# Patient Record
Sex: FEMALE | Race: WHITE | NOT HISPANIC OR LATINO | Employment: FULL TIME | URBAN - METROPOLITAN AREA
[De-identification: names, ages, dates, MRNs, and addresses within clinical notes are randomized per-mention and may not be internally consistent; named-entity substitution may affect disease eponyms.]

---

## 2017-01-09 RX ORDER — LEVETIRACETAM 500 MG/1
TABLET ORAL
Qty: 60 TABLET | Refills: 0 | Status: SHIPPED | OUTPATIENT
Start: 2017-01-09 | End: 2017-02-09 | Stop reason: SDUPTHER

## 2017-01-23 ENCOUNTER — HOSPITAL ENCOUNTER (OUTPATIENT)
Dept: CT IMAGING | Facility: HOSPITAL | Age: 52
Discharge: HOME | End: 2017-01-23
Admitting: PHYSICIAN ASSISTANT

## 2017-01-23 ENCOUNTER — OFFICE VISIT (OUTPATIENT)
Dept: NEUROSURGERY | Facility: CLINIC | Age: 52
End: 2017-01-23

## 2017-01-23 VITALS — TEMPERATURE: 98.5 F | WEIGHT: 135 LBS | HEIGHT: 66 IN | BODY MASS INDEX: 21.69 KG/M2

## 2017-01-23 DIAGNOSIS — S06.5XAA SUBACUTE SUBDURAL HEMATOMA (HCC): Primary | ICD-10-CM

## 2017-01-23 DIAGNOSIS — S06.5XAA SUBACUTE SUBDURAL HEMATOMA (HCC): ICD-10-CM

## 2017-01-23 PROCEDURE — 70450 CT HEAD/BRAIN W/O DYE: CPT

## 2017-01-23 PROCEDURE — 99214 OFFICE O/P EST MOD 30 MIN: CPT | Performed by: NEUROLOGICAL SURGERY

## 2017-01-23 NOTE — PROGRESS NOTES
Subjective     Chief Complaint: Subdural hematoma    Patient ID: Almaz Dimas is a 51 y.o. female is here today for follow-up.    Seizures    This is a new problem. The current episode started more than 1 week ago. The problem has been resolved. There was 1 seizure. Pertinent negatives include no confusion, no headaches, no speech difficulty, no visual disturbance, no sore throat, no chest pain, no cough, no nausea, no vomiting and no diarrhea. Characteristics include bladder incontinence and rhythmic jerking. Characteristics do not include apnea. The seizure(s) had no focality. There has been no fever.       This is a 51-year-old woman whom I saw in consultation in the hospital about 6 weeks ago for a subacute left sided subdural hematoma.  The patient had recently undergone some changes to her blood pressure medication, and had experienced a series of several syncopal and presyncopal episodes in the weeks leading up to her hospitalization.  She had an abnormal EEG and was started on antiseizure medications.  I was consult to for management of her small subdural.  She was ultimately discharged home with instructions to follow up with me with a repeat head CT, and she presents today to discuss the results of the CT.    Since being discharged from the hospital, she reports no problems with headaches, nausea, vomiting, strokelike symptoms, or seizures.  She does report that she still has episodes of lightheadedness.    The following portions of the patient's history were reviewed and updated as appropriate: allergies, current medications, past family history, past medical history, past social history, past surgical history and problem list.    Family history: No family history on file.    Social history:   Social History     Social History   • Marital status:      Spouse name: N/A   • Number of children: N/A   • Years of education: N/A     Occupational History   • Not on file.     Social History Main Topics    • Smoking status: Current Every Day Smoker     Packs/day: 0.50   • Smokeless tobacco: Not on file   • Alcohol use Yes      Comment: daily 4 beers daily   • Drug use: No   • Sexual activity: No      Comment:      Other Topics Concern   • Not on file     Social History Narrative       Review of Systems   Constitutional: Negative for activity change, appetite change, chills, diaphoresis, fatigue, fever and unexpected weight change.   HENT: Negative for congestion, dental problem, drooling, ear discharge, ear pain, facial swelling, hearing loss, mouth sores, nosebleeds, postnasal drip, rhinorrhea, sinus pressure, sneezing, sore throat, tinnitus, trouble swallowing and voice change.    Eyes: Negative for photophobia, pain, discharge, redness, itching and visual disturbance.   Respiratory: Negative for apnea, cough, choking, chest tightness, shortness of breath, wheezing and stridor.    Cardiovascular: Negative for chest pain, palpitations and leg swelling.   Gastrointestinal: Negative for abdominal distention, abdominal pain, anal bleeding, blood in stool, constipation, diarrhea, nausea, rectal pain and vomiting.   Endocrine: Negative for cold intolerance, heat intolerance, polydipsia, polyphagia and polyuria.   Genitourinary: Positive for bladder incontinence. Negative for decreased urine volume, difficulty urinating, dysuria, enuresis, flank pain, frequency, genital sores, hematuria and urgency.   Musculoskeletal: Negative for arthralgias, back pain, gait problem, joint swelling, myalgias, neck pain and neck stiffness.   Skin: Negative for color change, pallor, rash and wound.   Allergic/Immunologic: Negative for environmental allergies, food allergies and immunocompromised state.   Neurological: Negative for dizziness, tremors, seizures, syncope, facial asymmetry, speech difficulty, weakness, light-headedness, numbness and headaches.   Hematological: Negative for adenopathy. Does not bruise/bleed easily.  "  Psychiatric/Behavioral: Negative for agitation, behavioral problems, confusion, decreased concentration, dysphoric mood, hallucinations, self-injury, sleep disturbance and suicidal ideas. The patient is not nervous/anxious and is not hyperactive.    All other systems reviewed and are negative.      Objective   Temperature 98.5 °F (36.9 °C), height 66\" (167.6 cm), weight 135 lb (61.2 kg).  Body mass index is 21.79 kg/(m^2).    Physical Exam   Constitutional: She is oriented to person, place, and time. She appears well-developed and well-nourished.  Non-toxic appearance. No distress.   HENT:   Head: Normocephalic and atraumatic.   Mouth/Throat: Oropharynx is clear and moist.   Eyes: EOM are normal. Pupils are equal, round, and reactive to light. Right eye exhibits no discharge. Left eye exhibits no discharge.   Neck: Phonation normal. No JVD present. No tracheal deviation present. No thyromegaly present.   Cardiovascular: Normal rate and regular rhythm.    Pulmonary/Chest: Effort normal. No stridor. No respiratory distress. She has no wheezes.   Abdominal: Soft. Normal appearance. She exhibits no distension. There is no tenderness.   Musculoskeletal: She exhibits no edema.   Muscle Group     L          R  Deltoid                5          5  Bicep                  5          5  Tricep                 5          5                       5          5  Hand IO              5          5  Hip Flexor           5          5  Knee Extensor   5          5     ADF                    5          5  APF                    5          5      Neurological: She is alert and oriented to person, place, and time. She has normal reflexes. She displays normal reflexes. No cranial nerve deficit or sensory deficit. She exhibits normal muscle tone. She displays a negative Romberg sign. Coordination and gait normal. GCS eye subscore is 4. GCS verbal subscore is 5. GCS motor subscore is 6.   Reflex Scores:       Tricep reflexes are 2+ on " the right side and 2+ on the left side.       Bicep reflexes are 2+ on the right side and 2+ on the left side.       Brachioradialis reflexes are 2+ on the right side and 2+ on the left side.       Patellar reflexes are 2+ on the right side and 2+ on the left side.       Achilles reflexes are 2+ on the right side and 2+ on the left side.  CN II-XII grossly intact.    No pronator drift. FTN testing performed without dysmetria or bradykinesia.    Fine, resting axial tremor.  Fine, resting tremor of her right upper extremity as well.   Skin: Skin is warm and dry. She is not diaphoretic. No erythema.   Psychiatric: She has a normal mood and affect. Her speech is normal and behavior is normal. Judgment and thought content normal.   Nursing note and vitals reviewed.        Assessment/Plan     Independent Review of Radiographic Studies:      Available for my review is a noncontrast head CT of the brain that was performed today.  This is essentially an unremarkable study with no evidence of residual or recurrent subdural hematoma.  There are no extra-axial fluid collections.  There is no midline shift, and the ventricular system is unremarkable.  There is no radiographic evidence of acute intracranial abnormality.    Medical Decision Making:      This is a 51-year-old woman with a resolved left sided subdural hematoma.  I reviewed the signs and symptoms of intracranial mass effect with the patient.  I would not order any repeat imaging unless clinically indicated.  She can follow-up with me on an as-needed basis.    There are no diagnoses linked to this encounter.    No Follow-up on file.           This document signed by BELKIS Dinero MD January 23, 2017 10:02 AM

## 2017-01-23 NOTE — MR AVS SNAPSHOT
Almaz Dimas   1/23/2017 11:00 AM   Office Visit    Dept Phone:  [phone]   Encounter #:  [id number]    Provider:  Tai Dinero MD   Department:  [facility]                Your Full Care Plan              Your Updated Medication List          This list is accurate as of: 1/23/17 10:15 AM.  Always use your most recent med list.                amLODIPine 5 MG tablet   Commonly known as:  NORVASC       ibuprofen 800 MG tablet   Commonly known as:  ADVIL,MOTRIN       levETIRAcetam 500 MG tablet   Commonly known as:  KEPPRA   take 1 tablet by mouth every 12 hours       levothyroxine 25 MCG tablet   Commonly known as:  SYNTHROID, LEVOTHROID       lisinopril-hydrochlorothiazide 20-12.5 MG per tablet   Commonly known as:  PRINZIDE,ZESTORETIC       sertraline 50 MG tablet   Commonly known as:  ZOLOFT       SHAROBEL 0.35 MG tablet   Generic drug:  norethindrone               Instructions     None    Patient Instructions History      Upcoming Appointments     Visit Type Date Time Department    CT [facility] HEAD WO CONTRAST 1/23/2017  9:30 AM  [facility]    OFFICE VISIT 1/23/2017 11:00 AM [facility]    FOLLOW UP 3/30/2017  9:30 AM [facility]      MyChart Signup     Our records indicate that you have declined [facility] Online-ORhart signup. If you would like to sign up for Online-ORhart, please email [email or web address] or call [phone] to obtain an activation code.             Other Info from Your Visit           Your Appointments     Jan 23, 2017 11:00 AM EST   Office Visit with Tai Dinero MD   [facility] (--)    [address] ,  [address] [id number] [phone]           Arrive 15 minutes prior to appointment.            Mar 30, 2017  9:30 AM EDT   Follow Up with Nuzhat Parra MD   [facility] (--)    [address] [place] [id number] [phone]           Arrive 15 minutes prior to appointment.              Allergies     Clindamycin/lincomycin      Flagyl [Metronidazole]      Sulfa Antibiotics        Reason for Visit     Follow-up           Vital Signs     Temperature Height Weight Body Mass Index Smoking Status       98.5 °F (36.9 °C) 66\" (167.6 cm) 135 lb (61.2 kg) 21.79 kg/m2 Current Every Day Smoker         "

## 2017-02-10 ENCOUNTER — HOSPITAL ENCOUNTER (OUTPATIENT)
Dept: HOSPITAL 22 - RAD | Age: 52
End: 2017-02-10
Payer: MEDICAID

## 2017-02-10 DIAGNOSIS — R07.1: Primary | ICD-10-CM

## 2017-02-10 RX ORDER — LEVETIRACETAM 500 MG/1
TABLET ORAL
Qty: 60 TABLET | Refills: 6 | Status: SHIPPED | OUTPATIENT
Start: 2017-02-10 | End: 2017-03-30 | Stop reason: SDUPTHER

## 2017-02-10 NOTE — RADIOLOGY REPORT PS360
[id number]****
 
HISTORY: Posttraumatic chest wall pain
RT CHEST WALL PAIN
ORDERING PHYSICIAN: Ravi Valero
PATIENT AGE:  51 years
 
 
COMPARISON: None
 
FINDINGS: There are multiple healing right rib fractures involving the right
fourth, fifth, seventh, eighth, ninth, and 10th ribs with callus formation noted
over the fractures. The fourth rib fractures slightly displaced medially. There
is some mild pleural-parenchymal thickening along the right lower hemithorax
laterally with blunting of the CP angle suggesting small effusion versus pleural
thickening. There is some irregular increased density in the right lower lung
zone laterally could be due to atelectatic or fibrotic change. The left lung is
clear.
 
IMPRESSION: Multiple healing right rib fractures with pleural parenchymal
changes as described above

## 2017-02-10 NOTE — RADIOLOGY REPORT PS360
[id number]****
 
HISTORY: Posttraumatic chest wall pain
RT CHEST WALL PAIN
ORDERING PHYSICIAN: Ravi Valero
PATIENT AGE:  51 years
 
 
COMPARISON: None
 
FINDINGS: There are multiple healing right rib fractures involving the right
fourth, fifth, seventh, eighth, ninth, and 10th ribs with callus formation noted
over the fractures. The fourth rib fractures slightly displaced medially. There
is some mild pleural-parenchymal thickening along the right lower hemithorax
laterally with blunting of the CP angle suggesting small effusion versus pleural
thickening. There is some irregular increased density in the right lower lung
zone laterally could be due to atelectatic or fibrotic change. The left lung is
clear.
 
IMPRESSION: Multiple healing right rib fractures with pleural parenchymal
changes as described above

## 2017-03-30 ENCOUNTER — OFFICE VISIT (OUTPATIENT)
Dept: NEUROLOGY | Facility: CLINIC | Age: 52
End: 2017-03-30

## 2017-03-30 VITALS
BODY MASS INDEX: 21.69 KG/M2 | WEIGHT: 135 LBS | DIASTOLIC BLOOD PRESSURE: 76 MMHG | OXYGEN SATURATION: 99 % | SYSTOLIC BLOOD PRESSURE: 138 MMHG | RESPIRATION RATE: 18 BRPM | HEIGHT: 66 IN

## 2017-03-30 DIAGNOSIS — G40.919 EPILEPSY UNDETERMINED AS TO FOCAL OR GENERALIZED, INTRACTABLE (HCC): Primary | ICD-10-CM

## 2017-03-30 PROCEDURE — 99213 OFFICE O/P EST LOW 20 MIN: CPT | Performed by: PSYCHIATRY & NEUROLOGY

## 2017-03-30 RX ORDER — LEVETIRACETAM 500 MG/1
500 TABLET ORAL 2 TIMES DAILY
Qty: 60 TABLET | Refills: 11 | Status: SHIPPED | OUTPATIENT
Start: 2017-03-30 | End: 2019-02-11

## 2017-03-30 NOTE — PROGRESS NOTES
Subjective   Almaz CELIS is a 51 y.o. female.     History of Present Illness   Pt first seen here  for CBH f/u of seizure, with R side sx f/b GTCsz 16, found to have left SDH (about 1x6cm). Started on LVT 500mg bid, s/b neurosurgery who recommended conservative management of the SDH.      A year prior to that, had an episode where getting pajamas on, woke on bathroom floor, had been incontinent, found by  (who has since ), cleaned herself up and went to bed, didn't feel confused after.  heard fall but didn't see the event. No warning beforehand.  Has had 3 further episodes of LOC. First 16 was sitting at kitchen table one morning and woke up on floor, right side sore in ribs, but no TB or incontinence, unsure how long out. Felt OK afterward, carried on with day. Then with second event on , got up early, and again woke on floor, this time found by son in law, had had incontinence and head was sore. Diminished memory and not thinking clearly for a while afterwards, but after a while thinking clearly. Then third episode at work , eating a banana, woke with coworkers around her, on the floor. This was a witnessed convulsion.     Prior to these 3 events, no head injury.   In past has had fainting spells, or near syncope, where hot, has to sit down, feels like going to pass out, but not for some years. No similar feeling with these events, with no warning whatsoever  No  birth, CNS infection, TBI, or family h/o seizure.   Noted that given first event was a year ago, no apparent relation to recent SDH, so unclear if her seizures are of focal or generalized onset. With incontinence and lack of prodrome, as well as witnessed convulsion with one episode, these are all likely epileptic rather than syncopal spells.  Today reports doing well, no suspicion of seizures. Got dizzy a few times before discontinuing a blood pressure medicine that was started in November.  Feels a  little weak from being less active, off work.   Had f/u with neurosurgery. F/u CT (personally reviewed) showed no residual abnormality.     The following portions of the patient's history were reviewed and updated as appropriate: allergies, current medications, past family history, past medical history, past social history, past surgical history and problem list.    Review of Systems   Constitutional: Negative for fever and unexpected weight change.   Respiratory: Negative for cough and shortness of breath.    Cardiovascular: Negative for chest pain.       Objective   Physical Exam   Constitutional: She is oriented to person, place, and time. She appears well-developed and well-nourished.   HENT:   Head: Normocephalic and atraumatic.   Eyes: EOM are normal. Pupils are equal, round, and reactive to light.   Pulmonary/Chest: Effort normal.   Musculoskeletal: Normal range of motion.   Neurological: She is oriented to person, place, and time. She has a normal Finger-Nose-Finger Test and a normal Heel to Shin Test. Gait normal.   Skin: Skin is warm and dry.   Psychiatric: Her speech is normal.   Nursing note and vitals reviewed.      Neurologic Exam     Mental Status   Oriented to person, place, and time.   Speech: speech is normal   Level of consciousness: alert  Knowledge: consistent with education.   Able to name object. Normal comprehension.     Cranial Nerves     CN II   Visual fields full to confrontation.     CN III, IV, VI   Pupils are equal, round, and reactive to light.  Extraocular motions are normal.     CN VII   Facial expression full, symmetric.     CN IX, X   CN IX normal.   CN X normal.   Palate: symmetric    CN XI   CN XI normal.     CN XII   CN XII normal.     Motor Exam   Muscle bulk: normal  Right arm pronator drift: absent  Left arm pronator drift: absent    Strength   Strength 5/5 except as noted.     Sensory Exam   Light touch normal.     Gait, Coordination, and Reflexes     Gait  Gait:  normal    Coordination   Finger to nose coordination: normal  Heel to shin coordination: normal    Tremor   Resting tremor: absent  Intention tremor: absent  Action tremor: absent      Assessment/Plan   Almaz was seen today for seizures.    Diagnoses and all orders for this visit:    Epilepsy undetermined as to focal or generalized, intractable    Other orders  -     levETIRAcetam (KEPPRA) 500 MG tablet; Take 1 tablet by mouth 2 (Two) Times a Day.    Discussion/Summary:  Again discussed safety issues, driving law (can now legally drive), signs of seizures to monitor for, taking extra keppra 500mg and phone in case of suspicion of seizure.    Answered all questions.  18 minutes face to face time, 15 minutes spent in discussion as above.  Return in about 6 months (around 9/30/2017).

## 2017-12-07 ENCOUNTER — OFFICE VISIT (OUTPATIENT)
Dept: NEUROLOGY | Facility: CLINIC | Age: 52
End: 2017-12-07

## 2017-12-07 VITALS
HEIGHT: 66 IN | DIASTOLIC BLOOD PRESSURE: 90 MMHG | WEIGHT: 135 LBS | BODY MASS INDEX: 21.69 KG/M2 | SYSTOLIC BLOOD PRESSURE: 140 MMHG

## 2017-12-07 DIAGNOSIS — G40.919 EPILEPSY UNDETERMINED AS TO FOCAL OR GENERALIZED, INTRACTABLE (HCC): Primary | ICD-10-CM

## 2017-12-07 PROCEDURE — 99214 OFFICE O/P EST MOD 30 MIN: CPT | Performed by: PSYCHIATRY & NEUROLOGY

## 2017-12-07 NOTE — PROGRESS NOTES
Subjective   Almaz CELIS is a 52 y.o. female.     Chief Complaint   Patient presents with   • Seizures       History of Present Illness   Pt first seen here  for CBH f/u of seizure, with R side sx f/b GTCsz 16, found to have left SDH (about 1x6cm). Started on LVT 500mg bid, s/b neurosurgery who recommended conservative management of the SDH.       A year prior to that, had an episode where getting pajamas on, woke on bathroom floor, had been incontinent, found by  (who has since ), cleaned herself up and went to bed, didn't feel confused after.  heard fall but didn't see the event. No warning beforehand.  Has had 3 further episodes of LOC. First 16 was sitting at kitchen table one morning and woke up on floor, right side sore in ribs, but no TB or incontinence, unsure how long out. Felt OK afterward, carried on with day. Then with second event on , got up early, and again woke on floor, this time found by son in law, had had incontinence and head was sore. Diminished memory and not thinking clearly for a while afterwards, but after a while thinking clearly. Then third episode at work , eating a banana, woke with coworkers around her, on the floor. This was a witnessed convulsion.      Prior to these 3 events, no head injury.   In past has had fainting spells, or near syncope, where hot, has to sit down, feels like going to pass out, but not for some years. No similar feeling with these events, with no warning whatsoever  No  birth, CNS infection, TBI, or family h/o seizure.   Noted that given first event was a year ago, no apparent relation to recent SDH, so unclear if her seizures are of focal or generalized onset. With incontinence and lack of prodrome, as well as witnessed convulsion with one episode, these are all likely epileptic rather than syncopal spells.  3/17: doing well, no suspicion of seizures.  Had f/u with neurosurgery. F/u CT (personally reviewed)  showed no residual abnormality. Discussed safety, driving, continued LVT 500mg bid.  Today: has been doing well, cut dose on her own to 1 LVT 500mg -- doesn't think she really needs the medicine, and that it was stress that caused them. Cost of LVT will be an issue with new insurance. General health has been OK, happy to be back to work.  No seizures of any kind.     The following portions of the patient's history were reviewed and updated as appropriate: allergies, current medications, past family history, past medical history, past social history, past surgical history and problem list.    Allergies   Allergen Reactions   • Clindamycin/Lincomycin    • Flagyl [Metronidazole]    • Sulfa Antibiotics        Current Outpatient Prescriptions on File Prior to Visit   Medication Sig Dispense Refill   • ibuprofen (ADVIL,MOTRIN) 800 MG tablet Take 800 mg by mouth As Needed for mild pain (1-3).     • levETIRAcetam (KEPPRA) 500 MG tablet Take 1 tablet by mouth 2 (Two) Times a Day. (Patient taking differently: Take 500 mg by mouth Daily.) 60 tablet 11   • levothyroxine (SYNTHROID, LEVOTHROID) 25 MCG tablet Take 25 mcg by mouth Daily.     • norethindrone (SHAROBEL) 0.35 MG tablet Take 1 tablet by mouth Daily.     • [DISCONTINUED] amLODIPine (NORVASC) 5 MG tablet Take 5 mg by mouth Daily.     • [DISCONTINUED] lisinopril-hydrochlorothiazide (PRINZIDE,ZESTORETIC) 20-12.5 MG per tablet Take 1 tablet by mouth Daily.     • [DISCONTINUED] sertraline (ZOLOFT) 50 MG tablet Take 50 mg by mouth Daily.       No current facility-administered medications on file prior to visit.        Past Medical History:   Diagnosis Date   • Disease of thyroid gland    • Hypertension    • Raynaud phenomenon        No past surgical history on file.    Social History     Social History   • Marital status:      Spouse name: N/A   • Number of children: N/A   • Years of education: N/A     Occupational History   • Not on file.     Social History Main  "Topics   • Smoking status: Current Every Day Smoker     Packs/day: 0.50   • Smokeless tobacco: Not on file   • Alcohol use Yes      Comment: daily 4 beers daily   • Drug use: No   • Sexual activity: No      Comment:      Other Topics Concern   • Not on file     Social History Narrative       Review of Systems   Constitutional: Negative for fever and unexpected weight change.   Respiratory: Negative for cough and shortness of breath.    Cardiovascular: Negative for chest pain.       Objective   Blood pressure 140/90, height 167.6 cm (66\"), weight 61.2 kg (135 lb).    Physical Exam   Constitutional: She is oriented to person, place, and time. She appears well-developed and well-nourished.   HENT:   Head: Normocephalic and atraumatic.   Eyes: EOM are normal. Pupils are equal, round, and reactive to light.   Pulmonary/Chest: Effort normal.   Musculoskeletal: Normal range of motion.   Neurological: She is oriented to person, place, and time. She has a normal Finger-Nose-Finger Test and a normal Heel to Shin Test. Gait normal.   Skin: Skin is warm and dry.   Psychiatric: Her speech is normal.   Nursing note and vitals reviewed.      Neurologic Exam     Mental Status   Oriented to person, place, and time.   Speech: speech is normal   Level of consciousness: alert  Knowledge: consistent with education.   Able to name object. Normal comprehension.     Cranial Nerves     CN II   Visual fields full to confrontation.     CN III, IV, VI   Pupils are equal, round, and reactive to light.  Extraocular motions are normal.     CN VII   Facial expression full, symmetric.     CN IX, X   CN IX normal.   CN X normal.   Palate: symmetric    CN XI   CN XI normal.     CN XII   CN XII normal.     Motor Exam   Muscle bulk: normal  Right arm pronator drift: absent  Left arm pronator drift: absent    Strength   Strength 5/5 except as noted.     Sensory Exam   Light touch normal.     Gait, Coordination, and Reflexes     Gait  Gait: " normal    Coordination   Finger to nose coordination: normal  Heel to shin coordination: normal    Tremor   Resting tremor: absent  Intention tremor: absent  Action tremor: absent      Assessment/Plan     Almaz was seen today for seizures.    Diagnoses and all orders for this visit:    Epilepsy undetermined as to focal or generalized, intractable    Other orders  -     Topiramate ER (TROKENDI XR) 200 MG capsule sustained-release 24 hr; Take 200 mg by mouth Every Night.    Discussion/Summary:  Long discussion re: seizure tendency vs triggers (eg stress), and extremely high likelihood of further seizures without medication. Discussed risk of seizures, and potential for increased intractability. Discussed med alternatives at length, and will try trokendi, titrating to 200mg, Discussed potential SEs at length.   30 min face to face, 23 min in discussion as above.  Return in about 3 months (around 3/7/2018).

## 2018-05-14 RX ORDER — TOPIRAMATE 200 MG/1
CAPSULE, EXTENDED RELEASE ORAL
Qty: 30 CAPSULE | Refills: 3 | Status: SHIPPED | OUTPATIENT
Start: 2018-05-14 | End: 2018-10-02 | Stop reason: SDUPTHER

## 2018-05-24 RX ORDER — TOPIRAMATE 200 MG/1
CAPSULE, EXTENDED RELEASE ORAL
Qty: 30 CAPSULE | Refills: 3 | OUTPATIENT
Start: 2018-05-24

## 2018-10-02 RX ORDER — TOPIRAMATE 200 MG/1
CAPSULE, EXTENDED RELEASE ORAL
Qty: 30 CAPSULE | Refills: 3 | Status: SHIPPED | OUTPATIENT
Start: 2018-10-02 | End: 2019-02-11

## 2019-02-11 ENCOUNTER — OFFICE VISIT (OUTPATIENT)
Dept: NEUROLOGY | Facility: CLINIC | Age: 54
End: 2019-02-11

## 2019-02-11 VITALS
HEIGHT: 66 IN | WEIGHT: 135 LBS | BODY MASS INDEX: 21.69 KG/M2 | SYSTOLIC BLOOD PRESSURE: 126 MMHG | DIASTOLIC BLOOD PRESSURE: 66 MMHG

## 2019-02-11 DIAGNOSIS — G40.919 EPILEPSY UNDETERMINED AS TO FOCAL OR GENERALIZED, INTRACTABLE (HCC): Primary | ICD-10-CM

## 2019-02-11 PROCEDURE — 99214 OFFICE O/P EST MOD 30 MIN: CPT | Performed by: PHYSICIAN ASSISTANT

## 2019-02-11 RX ORDER — LEVETIRACETAM 500 MG/1
500 TABLET ORAL 2 TIMES DAILY
COMMUNITY
End: 2021-02-15

## 2019-02-11 NOTE — PROGRESS NOTES
Subjective     Chief Complaint: history of seizure s     History of Present Illness   Pt first seen here  for CBH f/u of seizure, with R side sx f/b GTCsz 16, found to have left SDH (about 1x6cm). Started on LVT 500mg bid, s/b neurosurgery who recommended conservative management of the SDH.       A year prior to that, had an episode where getting pajamas on, woke on bathroom floor, had been incontinent, found by  (who has since ), cleaned herself up and went to bed, didn't feel confused after.  heard fall but didn't see the event. No warning beforehand.  Has had 3 further episodes of LOC. First 16 was sitting at kitchen table one morning and woke up on floor, right side sore in ribs, but no TB or incontinence, unsure how long out. Felt OK afterward, carried on with day. Then with second event on , got up early, and again woke on floor, this time found by son in law, had had incontinence and head was sore. Diminished memory and not thinking clearly for a while afterwards, but after a while thinking clearly. Then third episode at work , eating a banana, woke with coworkers around her, on the floor. This was a witnessed convulsion.      Prior to these 3 events, no head injury.   In past has had fainting spells, or near syncope, where hot, has to sit down, feels like going to pass out, but not for some years. No similar feeling with these events, with no warning whatsoever  No  birth, CNS infection, TBI, or family h/o seizure.   Noted that given first event was a year ago, no apparent relation to recent SDH, so unclear if her seizures are of focal or generalized onset. With incontinence and lack of prodrome, as well as witnessed convulsion with one episode, these are all likely epileptic rather than syncopal spells.  3/17: doing well, no suspicion of seizures.  Had f/u with neurosurgery. F/u CT (personally reviewed) showed no residual abnormality. Discussed safety,  "driving, continued LVT 500mg bid.  12/17: has been doing well, cut dose on her own to 1 LVT 500mg -- doesn't think she really needs the medicine, and that it was stress that caused them. Cost of LVT will be an issue with new insurance. General health has been OK, happy to be back to work.  No seizures of any kind.      Today: She denies any seizure recurrence since her last visit and states that she is doing well overall. She ran out of Trokendi XR so states that she has been taking LVT 500mg nightly as she had this left over.         I have reviewed and confirmed the past family, social and medical history as accurate on 2/11/9.     Review of Systems   Constitutional: Negative.    HENT: Negative.    Eyes: Negative.    Respiratory: Negative.    Cardiovascular: Negative.    Gastrointestinal: Negative.    Endocrine: Negative.    Genitourinary: Negative.    Musculoskeletal: Negative.    Skin: Negative.    Allergic/Immunologic: Negative.    Neurological: Positive for seizures.   Hematological: Negative.    Psychiatric/Behavioral: Negative.        Objective     /66   Ht 167.6 cm (66\")   Wt 61.2 kg (135 lb)   BMI 21.79 kg/m²     General appearance today is normal.       Physical Exam   Neurological: She has normal strength. She has a normal Finger-Nose-Finger Test. Gait normal.   Psychiatric: Her speech is normal.        Neurologic Exam     Mental Status   Speech: speech is normal   Level of consciousness: alert  Normal comprehension.     Cranial Nerves   Cranial nerves II through XII intact.     Motor Exam   Muscle bulk: normal  Overall muscle tone: normal    Strength   Strength 5/5 throughout.     Sensory Exam   Light touch normal.     Gait, Coordination, and Reflexes     Gait  Gait: normal    Coordination   Finger to nose coordination: normal    Tremor   Resting tremor: absent          Assessment/Plan   Almaz was seen today for seizures.    Diagnoses and all orders for this visit:    Epilepsy undetermined as to " focal or generalized, intractable (CMS/HCC)    Other orders  -     Topiramate  MG capsule sustained-release 24 hr; Take 200 mg by mouth Every Night.          Discussion/Summary   Almaz CELIS returns to clinic today with a history of seizures. This was discussed in detail with the patient. After discussing potential treatment options, it was elected to restart Trokendi XR as she previously tolerated this well (I have provided her with samples to titrate up to 200mg). She will then follow up in 1 year, or sooner if needed.    I spent 25 minutes minutes face to face with the patient with 15 minutes spent on discussing diagnosis, prognosis, evaluation, current status, treatment options and management as discussed above.       As part of this visit I discussed the history with the patient .      Adelina Mora PA-C

## 2019-02-14 ENCOUNTER — TELEPHONE (OUTPATIENT)
Dept: NEUROLOGY | Facility: CLINIC | Age: 54
End: 2019-02-14

## 2019-02-14 NOTE — TELEPHONE ENCOUNTER
----- Message from Vanessa Mejíaed Rep sent at 2/12/2019  9:19 AM EST -----  Adelina     PT called in reference to the medication prescribed yesterday, Topiramate  MG capsule sustained-release 24 hr [[id number]] (Order [id number]). PT states that Adelina gave her a Department of Health and Human Services card to help cover the cost of the medication, but her insurance is requiring a PA. PT would like a PA completed and a call back when the medication has been filled.     Please call Almaz back:   [phone]

## 2019-02-20 ENCOUNTER — PRIOR AUTHORIZATION (OUTPATIENT)
Dept: NEUROLOGY | Facility: CLINIC | Age: 54
End: 2019-02-20

## 2019-02-20 NOTE — TELEPHONE ENCOUNTER
PA was submitted for patient's Trokendi XR 200MG Capsules on 02/20/2019 via CoverMyMeds. Key: [id number], DX Code: G40.919. Clinical questions were answered and sent to plan, waiting on determination. Alyssa stated that it could take between  hours to hear back with a reply.

## 2019-03-15 ENCOUNTER — HOSPITAL ENCOUNTER (OUTPATIENT)
Age: 54
End: 2019-03-15
Payer: COMMERCIAL

## 2019-03-15 DIAGNOSIS — Z12.31: Primary | ICD-10-CM

## 2019-03-15 PROCEDURE — 77067 SCR MAMMO BI INCL CAD: CPT

## 2019-07-19 RX ORDER — TOPIRAMATE 200 MG/1
CAPSULE, EXTENDED RELEASE ORAL
Qty: 30 CAPSULE | Refills: 3 | Status: SHIPPED | OUTPATIENT
Start: 2019-07-19 | End: 2019-12-05 | Stop reason: SDUPTHER

## 2019-07-25 RX ORDER — TOPIRAMATE 200 MG/1
CAPSULE, EXTENDED RELEASE ORAL
Qty: 30 CAPSULE | Refills: 3 | OUTPATIENT
Start: 2019-07-25

## 2019-12-02 RX ORDER — TOPIRAMATE 200 MG/1
CAPSULE, EXTENDED RELEASE ORAL
Qty: 30 CAPSULE | Refills: 3 | OUTPATIENT
Start: 2019-12-02

## 2019-12-09 RX ORDER — TOPIRAMATE 200 MG/1
CAPSULE, EXTENDED RELEASE ORAL
Qty: 30 CAPSULE | Refills: 3 | Status: SHIPPED | OUTPATIENT
Start: 2019-12-09 | End: 2020-02-10 | Stop reason: SDUPTHER

## 2019-12-09 RX ORDER — TOPIRAMATE 200 MG/1
CAPSULE, EXTENDED RELEASE ORAL
Qty: 30 CAPSULE | Refills: 3 | Status: SHIPPED | OUTPATIENT
Start: 2019-12-09 | End: 2019-12-09 | Stop reason: SDUPTHER

## 2020-02-10 ENCOUNTER — OFFICE VISIT (OUTPATIENT)
Dept: NEUROLOGY | Facility: CLINIC | Age: 55
End: 2020-02-10

## 2020-02-10 VITALS — HEIGHT: 66 IN | BODY MASS INDEX: 21.69 KG/M2 | WEIGHT: 135 LBS

## 2020-02-10 DIAGNOSIS — G40.919 EPILEPSY UNDETERMINED AS TO FOCAL OR GENERALIZED, INTRACTABLE (HCC): Primary | ICD-10-CM

## 2020-02-10 PROCEDURE — 99213 OFFICE O/P EST LOW 20 MIN: CPT | Performed by: PHYSICIAN ASSISTANT

## 2020-02-10 NOTE — PROGRESS NOTES
Subjective     Chief Complaint: seizures     History of Present Illness   Pt first seen here  for CBH f/u of seizure, with R side sx f/b GTCsz 16, found to have left SDH (about 1x6cm). Started on LVT 500mg bid, s/b neurosurgery who recommended conservative management of the SDH.       A year prior to that, had an episode where getting pajamas on, woke on bathroom floor, had been incontinent, found by  (who has since ), cleaned herself up and went to bed, didn't feel confused after.  heard fall but didn't see the event. No warning beforehand.  Has had 3 further episodes of LOC. First 16 was sitting at kitchen table one morning and woke up on floor, right side sore in ribs, but no TB or incontinence, unsure how long out. Felt OK afterward, carried on with day. Then with second event on , got up early, and again woke on floor, this time found by son in law, had had incontinence and head was sore. Diminished memory and not thinking clearly for a while afterwards, but after a while thinking clearly. Then third episode at work , eating a banana, woke with coworkers around her, on the floor. This was a witnessed convulsion.      Prior to these 3 events, no head injury.   In past has had fainting spells, or near syncope, where hot, has to sit down, feels like going to pass out, but not for some years. No similar feeling with these events, with no warning whatsoever  No  birth, CNS infection, TBI, or family h/o seizure.   Noted that given first event was a year ago, no apparent relation to recent SDH, so unclear if her seizures are of focal or generalized onset. With incontinence and lack of prodrome, as well as witnessed convulsion with one episode, these are all likely epileptic rather than syncopal spells.  3/17: doing well, no suspicion of seizures.  Had f/u with neurosurgery. F/u CT (personally reviewed) showed no residual abnormality. Discussed safety, driving,  "continued LVT 500mg bid.  12/17: has been doing well, cut dose on her own to 1 LVT 500mg -- doesn't think she really needs the medicine, and that it was stress that caused them. Cost of LVT will be an issue with new insurance. General health has been OK, happy to be back to work.  No seizures of any kind.      Today: Since her last visit in 2/19, she denies any seizure recurrence as well as any new concerns. She is still tolerating the Trokendi XR.        I have reviewed and confirmed the past family, social and medical history as accurate on 2/10/2020.     Review of Systems   Constitutional: Negative.    HENT: Negative.    Eyes: Negative.    Respiratory: Negative.    Cardiovascular: Negative.    Gastrointestinal: Negative.    Endocrine: Negative.    Genitourinary: Negative.    Musculoskeletal: Negative.    Skin: Negative.    Allergic/Immunologic: Negative.    Neurological: Positive for seizures.   Hematological: Negative.    Psychiatric/Behavioral: Negative.        Objective     Ht 167.6 cm (66\")   Wt 61.2 kg (135 lb)   BMI 21.79 kg/m²     General appearance today is normal.       Physical Exam   Neurological: Gait normal.   Psychiatric: Her speech is normal.        Neurologic Exam     Mental Status   Speech: speech is normal   Level of consciousness: alert  Normal comprehension.     Cranial Nerves   Cranial nerves II through XII intact.     Motor Exam   Muscle bulk: normal    Gait, Coordination, and Reflexes     Gait  Gait: normal    Tremor   Resting tremor: absent            Assessment/Plan   Almaz was seen today for seizures.    Diagnoses and all orders for this visit:    Epilepsy undetermined as to focal or generalized, intractable (CMS/HCC)    Other orders  -     Topiramate ER (TROKENDI XR) 200 MG capsule sustained-release 24 hr; Take 1 capsule by mouth Every Night.          Discussion/Summary   Almaz CELIS returns to clinic today with a history of seizures. This was discussed in detail. I again reviewed " her current status and treatment options. After discussing potential treatment options, it was elected to continue on Trokendri XR unchanged.She will then follow up in 1 year, or sooner if needed.   I spent 15 minutes face to face with the patient with 10 minutes spent on discussing diagnosis, prognosis, diagnostic testing, evaluation, current status, treatment options and management as discussed above.       As part of this visit I discussed the history with the patient  .      Adelina Mora PA-C

## 2020-02-11 ENCOUNTER — TELEPHONE (OUTPATIENT)
Dept: NEUROLOGY | Facility: CLINIC | Age: 55
End: 2020-02-11

## 2020-02-11 NOTE — TELEPHONE ENCOUNTER
On it! I actually received a call from pt's [facility] pharmacist, Sara who states Almaz is there now so relayed to both the pharmacist and patient that we would like to do a PA as this medication was prescribed for SZs and we do not want to chance anything!  Notified I can also send Almaz some samples in the mail to get her through until a decision is made regarding coverage from her insurance. However, Almaz states she ran out of her Trokendi 200 mg Nightly on Sunday evening and as she lives in [place] cannot come  samples from clinic and the samples in the mail will not arrive until Thursday most likely!    Luckily, her pharmacist, Sara agreed to give her a couple to get her through until the samples arrive and hopefully PA is approved!    Mailed 14 day supply of samples to verified address on file.

## 2020-02-11 NOTE — TELEPHONE ENCOUNTER
Sara from [facility] called stating that Trokendi XR is not covered by pt's new insurance plan. Sara states insurance is recommending generic Topamax. Pt has been on Trokendi for awhile, so she states she is not sure if you want to change Rx or try a PA. Please advise.

## 2020-02-18 ENCOUNTER — PRIOR AUTHORIZATION (OUTPATIENT)
Dept: NEUROLOGY | Facility: CLINIC | Age: 55
End: 2020-02-18

## 2020-02-20 ENCOUNTER — TELEPHONE (OUTPATIENT)
Dept: NEUROLOGY | Facility: CLINIC | Age: 55
End: 2020-02-20

## 2020-03-03 NOTE — TELEPHONE ENCOUNTER
Alyssa is not wanting to pay for this, however spoke with the patient and she stated she may be able to use the co pay card Adelina was given and contacted them today because she believes she even has some money left on her other card.   Requested we send in a couple of days supply to get her through which I have done and I have mailed her 2 weeks worth of samples again until she finds out about this co pay card. If it does not work out, we will ask RICHA Lawrence about an alternative.

## 2020-03-20 RX ORDER — TOPIRAMATE 200 MG/1
CAPSULE, EXTENDED RELEASE ORAL
Qty: 90 CAPSULE | Refills: 2 | Status: SHIPPED | OUTPATIENT
Start: 2020-03-20 | End: 2021-01-13

## 2020-10-20 ENCOUNTER — HOSPITAL ENCOUNTER (OUTPATIENT)
Age: 55
End: 2020-10-20
Payer: COMMERCIAL

## 2020-10-20 DIAGNOSIS — Z03.818: Primary | ICD-10-CM

## 2020-10-20 PROCEDURE — U0003 INFECTIOUS AGENT DETECTION BY NUCLEIC ACID (DNA OR RNA); SEVERE ACUTE RESPIRATORY SYNDROME CORONAVIRUS 2 (SARS-COV-2) (CORONAVIRUS DISEASE [COVID-19]), AMPLIFIED PROBE TECHNIQUE, MAKING USE OF HIGH THROUGHPUT TECHNOLOGIES AS DESCRIBED BY CMS-2020-01-R: HCPCS

## 2020-11-23 ENCOUNTER — HOSPITAL ENCOUNTER (OUTPATIENT)
Age: 55
End: 2020-11-23
Payer: COMMERCIAL

## 2020-11-23 DIAGNOSIS — Z01.812: ICD-10-CM

## 2020-11-23 DIAGNOSIS — Z11.59: ICD-10-CM

## 2020-11-23 DIAGNOSIS — Z98.41: ICD-10-CM

## 2020-11-23 PROCEDURE — 36415 COLL VENOUS BLD VENIPUNCTURE: CPT

## 2020-11-23 PROCEDURE — 86328 IA NFCT AB SARSCOV2 COVID19: CPT

## 2020-11-24 ENCOUNTER — HOSPITAL ENCOUNTER (OUTPATIENT)
Dept: HOSPITAL 22 - OR | Age: 55
Discharge: HOME | End: 2020-11-24
Payer: COMMERCIAL

## 2020-11-24 VITALS
RESPIRATION RATE: 18 BRPM | HEART RATE: 105 BPM | SYSTOLIC BLOOD PRESSURE: 195 MMHG | DIASTOLIC BLOOD PRESSURE: 95 MMHG | OXYGEN SATURATION: 100 % | TEMPERATURE: 97.6 F

## 2020-11-24 VITALS
TEMPERATURE: 98 F | SYSTOLIC BLOOD PRESSURE: 153 MMHG | DIASTOLIC BLOOD PRESSURE: 87 MMHG | OXYGEN SATURATION: 98 % | RESPIRATION RATE: 16 BRPM | HEART RATE: 75 BPM

## 2020-11-24 VITALS
DIASTOLIC BLOOD PRESSURE: 87 MMHG | OXYGEN SATURATION: 97 % | RESPIRATION RATE: 16 BRPM | HEART RATE: 94 BPM | SYSTOLIC BLOOD PRESSURE: 157 MMHG

## 2020-11-24 VITALS
RESPIRATION RATE: 16 BRPM | OXYGEN SATURATION: 99 % | HEART RATE: 89 BPM | SYSTOLIC BLOOD PRESSURE: 162 MMHG | DIASTOLIC BLOOD PRESSURE: 89 MMHG

## 2020-11-24 VITALS
HEART RATE: 86 BPM | RESPIRATION RATE: 16 BRPM | DIASTOLIC BLOOD PRESSURE: 82 MMHG | SYSTOLIC BLOOD PRESSURE: 150 MMHG | OXYGEN SATURATION: 99 %

## 2020-11-24 VITALS
OXYGEN SATURATION: 98 % | HEART RATE: 87 BPM | RESPIRATION RATE: 16 BRPM | SYSTOLIC BLOOD PRESSURE: 150 MMHG | DIASTOLIC BLOOD PRESSURE: 85 MMHG

## 2020-11-24 VITALS — BODY MASS INDEX: 23.8 KG/M2

## 2020-11-24 DIAGNOSIS — H57.03: ICD-10-CM

## 2020-11-24 DIAGNOSIS — H25.811: Primary | ICD-10-CM

## 2020-11-24 DIAGNOSIS — H02.834: ICD-10-CM

## 2020-11-24 DIAGNOSIS — I10: ICD-10-CM

## 2020-11-24 DIAGNOSIS — H02.831: ICD-10-CM

## 2020-11-24 DIAGNOSIS — R56.9: ICD-10-CM

## 2020-11-24 PROCEDURE — 66982 XCAPSL CTRC RMVL CPLX WO ECP: CPT

## 2020-11-24 PROCEDURE — V2632 POST CHMBR INTRAOCULAR LENS: HCPCS

## 2021-01-13 RX ORDER — TOPIRAMATE 200 MG/1
CAPSULE, EXTENDED RELEASE ORAL
Qty: 90 CAPSULE | Refills: 0 | Status: SHIPPED | OUTPATIENT
Start: 2021-01-13 | End: 2021-02-15 | Stop reason: SDUPTHER

## 2021-02-15 ENCOUNTER — OFFICE VISIT (OUTPATIENT)
Dept: NEUROLOGY | Facility: CLINIC | Age: 56
End: 2021-02-15

## 2021-02-15 DIAGNOSIS — G40.919 EPILEPSY UNDETERMINED AS TO FOCAL OR GENERALIZED, INTRACTABLE (HCC): Primary | ICD-10-CM

## 2021-02-15 PROCEDURE — 99442 PR PHYS/QHP TELEPHONE EVALUATION 11-20 MIN: CPT | Performed by: PHYSICIAN ASSISTANT

## 2021-02-15 RX ORDER — TOPIRAMATE 200 MG/1
1 CAPSULE, EXTENDED RELEASE ORAL NIGHTLY
Qty: 90 CAPSULE | Refills: 3 | Status: SHIPPED | OUTPATIENT
Start: 2021-02-15 | End: 2021-03-31 | Stop reason: SDUPTHER

## 2021-02-15 NOTE — PROGRESS NOTES
Subjective     You have chosen to receive care through a telehealth visit.  Do you consent to use a video/audio connection for your medical care today? Yes      Chief Complaint: seizures      History of Present Illness   Pt first seen here  for CBH f/u of seizure, with R side sx f/b GTCsz 16, found to have left SDH (about 1x6cm). Started on LVT 500mg bid, s/b neurosurgery who recommended conservative management of the SDH.       A year prior to that, had an episode where getting pajamas on, woke on bathroom floor, had been incontinent, found by  (who has since ), cleaned herself up and went to bed, didn't feel confused after.  heard fall but didn't see the event. No warning beforehand.  Has had 3 further episodes of LOC. First 16 was sitting at kitchen table one morning and woke up on floor, right side sore in ribs, but no TB or incontinence, unsure how long out. Felt OK afterward, carried on with day. Then with second event on , got up early, and again woke on floor, this time found by son in law, had had incontinence and head was sore. Diminished memory and not thinking clearly for a while afterwards, but after a while thinking clearly. Then third episode at work , eating a banana, woke with coworkers around her, on the floor. This was a witnessed convulsion.      Prior to these 3 events, no head injury.   In past has had fainting spells, or near syncope, where hot, has to sit down, feels like going to pass out, but not for some years. No similar feeling with these events, with no warning whatsoever  No  birth, CNS infection, TBI, or family h/o seizure.   Noted that given first event was a year ago, no apparent relation to recent SDH, so unclear if her seizures are of focal or generalized onset. With incontinence and lack of prodrome, as well as witnessed convulsion with one episode, these are all likely epileptic rather than syncopal spells.  3/17: doing well, no  suspicion of seizures.  Had f/u with neurosurgery. F/u CT (personally reviewed) showed no residual abnormality. Discussed safety, driving, continued LVT 500mg bid.  12/17: has been doing well, cut dose on her own to 1 LVT 500mg -- doesn't think she really needs the medicine, and that it was stress that caused them. Cost of LVT will be an issue with new insurance. General health has been OK, happy to be back to work.  No seizures of any kind.     Today: Since her last visit in 2/20, she denies any recurrent seizures and states that she is doing well overall. She continues to take Trokendi XR 200mg nightly and is tolerating this well.       I have reviewed and confirmed the past family, social and medical history as accurate on 2/15/21.     Review of Systems   Constitutional: Negative.    HENT: Negative.    Eyes: Negative.    Respiratory: Negative.    Cardiovascular: Negative.    Gastrointestinal: Negative.    Endocrine: Negative.    Genitourinary: Negative.    Musculoskeletal: Negative.    Skin: Negative.    Allergic/Immunologic: Negative.    Neurological: Positive for seizures.   Hematological: Negative.    Psychiatric/Behavioral: Negative.        Objective       Physical Exam  Psychiatric:         Speech: Speech normal.          Neurologic Exam     Mental Status   Speech: speech is normal   Level of consciousness: alert  Normal comprehension.           Assessment/Plan   Diagnoses and all orders for this visit:    1. Epilepsy undetermined as to focal or generalized, intractable (CMS/HCC) (Primary)    Other orders  -     Topiramate ER (Trokendi XR) 200 MG capsule sustained-release 24 hr; Take 1 capsule by mouth Every Night.  Dispense: 90 capsule; Refill: 3          Discussion/Summary   Almaz CELIS returns to clinic today with a history of epilepsy. I again reviewed her current status and treatment options. After discussing potential treatment options, it was elected to continue on Trokendi XR unchanged as she is  doing well overall. She will then follow up in 1 year, or sooner if needed.   I spent 15 minutes on the phone with the patient with 10 minutes spent on discussing diagnosis, prognosis, evaluation, current status, treatment options and management as discussed above.     This visit was performed over the phone with patient's consent.     As part of this visit I discussed the history with the patient .      Adelina Mora PA-C

## 2021-02-22 ENCOUNTER — TELEPHONE (OUTPATIENT)
Dept: NEUROLOGY | Facility: CLINIC | Age: 56
End: 2021-02-22

## 2021-02-22 NOTE — TELEPHONE ENCOUNTER
Pt called in stating her pharmacy bill could not refill her trokendi rx due to needing a PA. States they have not received a PA. States she has been out of this medication for awhile.    Please update pt on this.

## 2021-02-26 NOTE — TELEPHONE ENCOUNTER
I have called Alyssa the patient coverage ended on 01/01/2020. I have tried to notify the patient to inquire about current coverage, the phone line rang then went silent.

## 2021-03-10 ENCOUNTER — TELEPHONE (OUTPATIENT)
Dept: NEUROLOGY | Facility: CLINIC | Age: 56
End: 2021-03-10

## 2021-03-10 NOTE — TELEPHONE ENCOUNTER
Covid-19 Concern    Patient Name: JAMIE CELIS  : 1965    PT Diagnosis/Symptoms: Epilepsy undetermined as to focal or generalized, intractable     Concern or Questions if Applicable: PT STATING THAT SHE DON'T KNOW WHICH COVID VACCINE IS BUT ONE OF THEM SHE HEARD IF YOU HAVE SEIZURES ALREADY THAT IT WILL CAUSE YOU TO HAVE MORE, THIS IS WHAT HER PCP TOLD HER YESTERDAY AND THAT SHE NEEDS TO TALK TO HER NEUROLOGIST. SHE WOULD LIKE TO TALK TO CODY LEMA PA-C AND DISCUSS THIS WITH HER.    Best call back number: [phone]

## 2021-03-30 ENCOUNTER — TELEPHONE (OUTPATIENT)
Dept: NEUROLOGY | Facility: CLINIC | Age: 56
End: 2021-03-30

## 2021-03-31 RX ORDER — TOPIRAMATE 200 MG/1
1 CAPSULE, EXTENDED RELEASE ORAL NIGHTLY
Qty: 90 CAPSULE | Refills: 3 | Status: SHIPPED | OUTPATIENT
Start: 2021-03-31

## 2021-03-31 RX ORDER — TOPIRAMATE 200 MG/1
1 CAPSULE, EXTENDED RELEASE ORAL NIGHTLY
Qty: 90 CAPSULE | Refills: 3 | OUTPATIENT
Start: 2021-03-31

## 2021-03-31 NOTE — TELEPHONE ENCOUNTER
CALLED PT AND SET-UP 1 YR FOLLOW UP APPT. PT ALSO WANTED TO REQUEST A REFILL ON HER TROKENDI XR 200MG.

## 2021-04-12 ENCOUNTER — TELEPHONE (OUTPATIENT)
Dept: NEUROLOGY | Facility: CLINIC | Age: 56
End: 2021-04-12

## 2021-04-12 NOTE — TELEPHONE ENCOUNTER
Called the pharmacy to get more information. He stated that a PA will be faxed over for the medication.

## 2021-04-12 NOTE — TELEPHONE ENCOUNTER
Provider: CODY LEMA    Caller: JAMIE CELIS    Relationship to Patient: SELF    Phone Number: [phone]    Reason for Call: PATIENT HAS NOT HAD ANY OF HER MEDICATION SINCE SHE SAW DR LEMA LAST . ASKING TO SEND OVER A PA FOR TROKENDI XR TO [facility] .        [id number] [place] - [address] AT [address] [facility] - [phone]  - [phone]   [phone]

## 2021-04-13 ENCOUNTER — TELEPHONE (OUTPATIENT)
Dept: NEUROLOGY | Facility: CLINIC | Age: 56
End: 2021-04-13

## 2021-07-13 ENCOUNTER — HOSPITAL ENCOUNTER (OUTPATIENT)
Age: 56
End: 2021-07-13
Payer: COMMERCIAL

## 2021-07-13 DIAGNOSIS — E03.9: Primary | ICD-10-CM

## 2021-07-13 LAB
T4 FREE SERPL-MCNC: 0.79 NG/DL (ref 0.78–2.19)
TSH SERPL-ACNC: 12.4 UIU/ML (ref 0.47–4.68)

## 2021-07-13 PROCEDURE — 84439 ASSAY OF FREE THYROXINE: CPT

## 2021-07-13 PROCEDURE — 36415 COLL VENOUS BLD VENIPUNCTURE: CPT

## 2021-07-13 PROCEDURE — 84443 ASSAY THYROID STIM HORMONE: CPT

## 2021-07-13 PROCEDURE — 84481 FREE ASSAY (FT-3): CPT

## 2021-07-15 LAB — T3FREE SERPL-MCNC: 2.8 PG/ML (ref 2–4.4)

## 2022-06-06 ENCOUNTER — HOSPITAL ENCOUNTER (OUTPATIENT)
Age: 57
End: 2022-06-06
Payer: COMMERCIAL

## 2022-06-06 DIAGNOSIS — E03.9: Primary | ICD-10-CM

## 2022-06-06 LAB
T4 FREE SERPL-MCNC: 1.23 NG/DL (ref 0.78–2.19)
TSH SERPL-ACNC: 2.5 UIU/ML (ref 0.47–4.68)

## 2022-06-06 PROCEDURE — 84439 ASSAY OF FREE THYROXINE: CPT

## 2022-06-06 PROCEDURE — 84443 ASSAY THYROID STIM HORMONE: CPT

## 2022-06-06 PROCEDURE — 84481 FREE ASSAY (FT-3): CPT

## 2022-06-06 PROCEDURE — 36415 COLL VENOUS BLD VENIPUNCTURE: CPT

## 2022-06-08 LAB — T3FREE SERPL-MCNC: 2.4 PG/ML (ref 2–4.4)
